# Patient Record
Sex: MALE | Race: WHITE
[De-identification: names, ages, dates, MRNs, and addresses within clinical notes are randomized per-mention and may not be internally consistent; named-entity substitution may affect disease eponyms.]

---

## 2017-12-23 NOTE — ED PDOC
Arrival/HPI





- General


Chief Complaint: Psychiatric Evaluation


Time Seen by Provider: 12/23/17 00:21


Historian: Patient, Family (Daughter)





- History of Present Illness


Narrative History of Present Illness (Text): 





12/23/17 00:39


57  year old male, whose past medical history includes hypertension, asthma, 

diabetes, and bipolar disorder (Noncompliant with Risperidone), presents to the 

emergency department [facility] for delusional behavior at work today. Patient states 

"AKASH is after him" and called the police. Patient's daughter reports this has 

been going on for months and has an appointment to see the psychologist Dr. So Rowan. Patient's daughter reportedly states he was here last week and 

was discharged after PES evaluation. Patient is A&Ox3. Patient denies recent 

travel, drug use, alcohol use, any fever, chills, chest pain, shortness of 

breath, nausea, vomiting, diarrhea, urinary symptoms, back pain, neck pain, 

headache, dizziness, suicidal/homicidal Ideation or any other complaints. 





PMD: Dr. Gomez


Symptom Onset: Sudden


Symptom Course: Unchanged


Activities at Onset: Light


Context: Work





Past Medical History





- Provider Review


Nursing Documentation Reviewed: Yes





- Travel History


Have you recently traveled outside [place] w/in the past 3 mons?: No





- Pulmonary


Hx Asthma: Yes





- Psychiatric


Hx Substance Use: No





Family/Social History





- Physician Review


Nursing Documentation Reviewed: Yes


Family/Social History: No Known Family HX


Smoking Status: no


Hx Alcohol Use: No


Hx Substance Use: No





Allergies/Home Meds


Allergies/Adverse Reactions: 


Allergies





montelukast [From Singulair] Allergy (Verified 12/23/17 00:23)


 RASH








Home Medications: 


 Home Meds











 Medication  Instructions  Recorded  Confirmed


 


Unobtainable  12/23/17 12/23/17














Review of Systems





- Physician Review


All systems were reviewed & negative as marked: Yes





- Review of Systems


Constitutional: absent: Fevers, Other (Chills)


Respiratory: absent: SOB


Cardiovascular: absent: Chest Pain


Gastrointestinal: absent: Diarrhea, Nausea, Vomiting


Genitourinary Male: absent: Dysuria, Frequency, Hematuria


Musculoskeletal: absent: Back Pain, Neck Pain


Neurological: absent: Headache, Dizziness


Psychiatric: Other (Dilusional behavior).  absent: Suicidal Ideation (/

homicidal ideation)





Physical Exam


Vital Signs Reviewed: Yes


Vital Signs











  Temp Pulse Resp BP Pulse Ox


 


 12/23/17 02:16  98.5 F  80  17  124/71  98


 


 12/23/17 00:17  97.9 F  84  18  142/78  99











Temperature: Afebrile


Blood Pressure: Normal


Pulse: Regular


Respiratory Rate: Normal


Appearance: Positive for: Well-Appearing, Non-Toxic, Comfortable


Pain Distress: None


Mental Status: Positive for: Alert and Oriented X 3





- Systems Exam


Head: Present: Atraumatic, Normocephalic


Pupils: Present: PERRL


Extroacular Muscles: Present: EOMI


Conjunctiva: Present: Normal


Mouth: Present: Moist Mucous Membranes


Neck: Present: Normal Range of Motion


Respiratory/Chest: Present: Clear to Auscultation, Good Air Exchange.  No: 

Respiratory Distress, Accessory Muscle Use


Cardiovascular: Present: Regular Rate and Rhythm, Normal S1, S2.  No: Murmurs


Abdomen: Present: Normal Bowel Sounds.  No: Tenderness, Distention, Peritoneal 

Signs


Back: Present: Normal Inspection


Upper Extremity: Present: Normal Inspection.  No: Cyanosis, Edema


Lower Extremity: Present: Normal Inspection.  No: Edema


Neurological: Present: GCS=15, CN II-XII Intact, Speech Normal


Skin: Present: Warm, Dry, Normal Color.  No: Rashes


Psychiatric: Present: Alert, Oriented x 3, Normal Insight, Delusional





Medical Decision Making


ED Course and Treatment: 





12/23/17 00:39





You were treated in the ED for delusional behavior at work today. Patient 

states "AKASH is after him" and called the police. Patient's daughter reports 

this has been going on for months and has an appointment to see the 

psychologist Dr. So Rowan. Patient's daughter reportedly states he was 

here last week and was discharged after PES evaluation. Patient is A&Ox3. 

Patient denies recent travel, drug use, alcohol use, any fever, chills, chest 

pain, shortness of breath, nausea, vomiting, diarrhea, urinary symptoms, back 

pain, neck pain, headache, dizziness, suicidal/homicidal Ideation or any other 

complaints. You were otherwise breathing easily, smiling with your son and 

daughter, good strength/sensation, walking easily, clear lungs, no abdomen 

tenderness, no fever temp _97.9__, stable heart rate _84___, stable breathing 

rate __18__, excellent oxygen level 99% room air, elevated blood pressure __142/

78___ which we recommend repeat in 2-3 days primary care office to determine 

further treatment, you have blood tests no infection count 4.5, stable blood 

level hemoglobin 14.2/platelets 223, stable chemistry sodium 139, potassium 4.1

, bicarbonate 25, chloride 102, bun 16, creatinine 1.1, mildly elevated glucose 

216, liver AST/ALT 23/36, Liver Alklaline Phosphatase 70, Liver bilirubin 0.4, 

heart blood test <0.01, urine test High Trace of Protein and Ketones, radiology 

Chest X-ray mild vascular markings and recommend followup primary care and 

pulmonary referral 2-3 days to ensure no complications/cancer development, ECG 

normal sinus rhythm,  PES stated you can stay in the hospital if voluntary but 

since your not having thoughts to harm yourself or anyone else that if you want 

you can go home which you and your family want to go home but you must be 

compliant with your risperdone to help you control your thoughts done thus in 

the ED with improvement, counselled to take your risperdone and thus discharged 

home with your family who will watch you and you have a good support network. 

1. Recommend follow-up primary care 2-3 days to review symptoms, followup with 

your mental health appointment that is planned, endocrine clinic for mildly 

elevated sugar control, urology referral to ensure no complications for protein 

in urine. 4. If any worsening pain, fever, chills, nausea, vomiting, difficulty 

breathing, numbness, loss of limb function, pain with urination or any medical 

condition then return to the ED. 








12/23/17 03:35





12/23/17 03:35





12/23/17 03:41








- Lab Interpretations


Lab Results: 








 12/23/17 00:54 





 12/23/17 00:54 





 Lab Results





12/23/17 01:35: Urine Opiates Screen Negative, Urine Methadone Screen Negative, 

Ur Barbiturates Screen Negative, Ur Phencyclidine Scrn Negative, Ur 

Amphetamines Screen Negative, U Benzodiazepines Scrn Negative, U Oth Cocaine 

Metabols Negative, U Cannabinoids Screen Negative


12/23/17 01:35: Urine Color Yellow, Urine Appearance Clear, Urine pH 5.5, Ur 

Specific Gravity >= 1.030, Urine Protein Trace H, Urine Glucose (UA) Negative, 

Urine Ketones Trace H, Urine Blood Negative, Urine Nitrate Negative, Urine 

Bilirubin Negative, Urine Urobilinogen 0.2, Ur Leukocyte Esterase Negative, 

Urine RBC 0 - 2, Urine WBC 5 - 10, Ur Epithelial Cells 0 - 2, Urine Bacteria 

Many


12/23/17 00:54: Alcohol, Quantitative < 10


12/23/17 00:54: Salicylates < 1 L, Acetaminophen < 10.0 L


12/23/17 00:54: Sodium 139, Potassium 4.1, Chloride 102, Carbon Dioxide 25, 

Anion Gap 16, BUN 16, Creatinine 1.1, Est GFR (African Amer) > 60, Est GFR (Non-

Af Amer) > 60, Random Glucose 216 H, Calcium 9.4, Total Bilirubin 0.4, AST 23, 

ALT 36, Alkaline Phosphatase 70, Troponin I < 0.01, Total Protein 7.4, Albumin 

4.1, Globulin 3.2, Albumin/Globulin Ratio 1.3


12/23/17 00:54: WBC 4.5, RBC 5.64, Hgb 14.2, Hct 43.9, MCV 77.8 L, MCH 25.2, 

MCHC 32.3, RDW 13.5, Plt Count 223, MPV 9.8, Gran % 41.3 L, Lymph % (Auto) 42.6 

H, Mono % (Auto) 8.5 H, Eos % (Auto) 6.7 H, Baso % (Auto) 0.9, Gran # 1.84, 

Lymph # 1.9, Mono # 0.4, Eos # 0.3, Baso # 0.04








I have reviewed the lab results: Yes





- RAD Interpretation


Radiology Orders: 








12/23/17 00:37


CHEST PORTABLE [RAD] Stat 














- EKG Interpretation


Interpreted by ED Physician: Yes (NSR, flipped t waves avr, v1.)


Type: 12 lead EKG





- Scribe Statement


The provider has reviewed the documentation as recorded by the Monika Whitmore





Provider Scribe Attestation:


All medical record entries made by the Scribe were at my direction and 

personally dictated by me. I have reviewed the chart and agree that the record 

accurately reflects my personal performance of the history, physical exam, 

medical decision making, and the department course for this patient. I have 

also personally directed, reviewed, and agree with the discharge instructions 

and disposition.








Disposition/Present on Arrival





- Present on Arrival


Any Indicators Present on Arrival: No


History of DVT/PE: No


History of Uncontrolled Diabetes: No


Urinary Catheter: No


History of Decub. Ulcer: No


History Surgical Site Infection Following: None





- Disposition


Have Diagnosis and Disposition been Completed?: Yes


Diagnosis: 


 Hallucination





Disposition: HOME/ ROUTINE


Disposition Time: 03:40


Patient Plan: Discharge


Condition: IMPROVED


Additional Instructions: 


You were treated in the ED for delusional behavior at work today. Patient 

states "AKASH is after him" and called the police. Patient's daughter reports 

this has been going on for months and has an appointment to see the 

psychologist Dr. So Rowan. Patient's daughter reportedly states he was 

here last week and was discharged after PES evaluation. Patient is A&Ox3. 

Patient denies recent travel, drug use, alcohol use, any fever, chills, chest 

pain, shortness of breath, nausea, vomiting, diarrhea, urinary symptoms, back 

pain, neck pain, headache, dizziness, suicidal/homicidal Ideation or any other 

complaints. You were otherwise breathing easily, smiling with your son and 

daughter, good strength/sensation, walking easily, clear lungs, no abdomen 

tenderness, no fever temp _97.9__, stable heart rate _84___, stable breathing 

rate __18__, excellent oxygen level 99% room air, elevated blood pressure __142/

78___ which we recommend repeat in 2-3 days primary care office to determine 

further treatment, you have blood tests no infection count 4.5, stable blood 

level hemoglobin 14.2/platelets 223, stable chemistry sodium 139, potassium 4.1

, bicarbonate 25, chloride 102, bun 16, creatinine 1.1, mildly elevated glucose 

216, liver AST/ALT 23/36, Liver Alklaline Phosphatase 70, Liver bilirubin 0.4, 

heart blood test <0.01, urine test High Trace of Protein and Ketones, radiology 

Chest X-ray mild vascular markings and recommend followup primary care and 

pulmonary referral 2-3 days to ensure no complications/cancer development, ECG 

normal sinus rhythm,  PES stated you can stay in the hospital if voluntary but 

since your not having thoughts to harm yourself or anyone else that if you want 

you can go home which you and your family want to go home but you must be 

compliant with your risperdone to help you control your thoughts done thus in 

the ED with improvement, counselled to take your risperdone and thus discharged 

home with your family who will watch you and you have a good support network. 

1. Recommend follow-up primary care 2-3 days to review symptoms, followup with 

your mental health appointment that is planned, endocrine clinic for mildly 

elevated sugar control, urology referral to ensure no complications for protein 

in urine. 4. If any worsening pain, fever, chills, nausea, vomiting, difficulty 

breathing, numbness, loss of limb function, pain with urination or any medical 

condition then return to the ED. 





Referrals: 


Efren Gomez MD [Primary Care Provider] - Follow up with primary


Forms:  [employer] (English)

## 2017-12-23 NOTE — RAD
HISTORY:

57yoM hallucinations  



COMPARISON:

03/17/2015 



FINDINGS:



LUNGS:

No active pulmonary disease. Multiple calcified granuloma 

bilaterally. 



PLEURA:

No significant pleural effusion identified, no pneumothorax apparent.



CARDIOVASCULAR:

Normal.



OSSEOUS STRUCTURES:

No significant abnormalities.



VISUALIZED UPPER ABDOMEN:

Normal.



OTHER FINDINGS:

None.



IMPRESSION:

No active disease. No significant interval change compared to the 

prior examination(s).

## 2017-12-23 NOTE — CARD
--------------- APPROVED REPORT --------------





EKG Measurement

Heart [id number]

ND [id number]

[id number]

[id number]

[id number]



<Conclusion>

Normal sinus rhythm

Mild ST elevations 2,3,F. Probably J-point elevations

## 2019-01-31 ENCOUNTER — HOSPITAL ENCOUNTER (OUTPATIENT)
Dept: HOSPITAL 42 - CARDIO | Age: 59
End: 2019-01-31
Payer: MEDICAID